# Patient Record
Sex: FEMALE | URBAN - METROPOLITAN AREA
[De-identification: names, ages, dates, MRNs, and addresses within clinical notes are randomized per-mention and may not be internally consistent; named-entity substitution may affect disease eponyms.]

---

## 2024-04-12 ENCOUNTER — LAB (OUTPATIENT)
Dept: URBAN - METROPOLITAN AREA CLINIC 115 | Facility: CLINIC | Age: 84
End: 2024-04-12

## 2024-04-12 ENCOUNTER — OV NP (OUTPATIENT)
Dept: URBAN - METROPOLITAN AREA CLINIC 115 | Facility: CLINIC | Age: 84
End: 2024-04-12
Payer: MEDICARE

## 2024-04-12 VITALS
HEIGHT: 64 IN | TEMPERATURE: 97.6 F | DIASTOLIC BLOOD PRESSURE: 60 MMHG | SYSTOLIC BLOOD PRESSURE: 122 MMHG | BODY MASS INDEX: 20.62 KG/M2 | HEART RATE: 71 BPM | WEIGHT: 120.8 LBS

## 2024-04-12 DIAGNOSIS — R54 ADVANCED AGE: ICD-10-CM

## 2024-04-12 DIAGNOSIS — K62.5 BRBPR (BRIGHT RED BLOOD PER RECTUM): ICD-10-CM

## 2024-04-12 DIAGNOSIS — K52.9 CHRONIC DIARRHEA: ICD-10-CM

## 2024-04-12 PROBLEM — 49808004: Status: ACTIVE | Noted: 2024-04-12

## 2024-04-12 PROCEDURE — 99204 OFFICE O/P NEW MOD 45 MIN: CPT | Performed by: INTERNAL MEDICINE

## 2024-04-12 NOTE — HPI-TODAY'S VISIT:
small TV adenoma removed colon '09. chr diarrhea, increase in frequency recently, over months, only bloody once, no sig pains, on no meds.

## 2024-05-01 ENCOUNTER — OFFICE VISIT (OUTPATIENT)
Dept: URBAN - METROPOLITAN AREA SURGERY CENTER 13 | Facility: SURGERY CENTER | Age: 84
End: 2024-05-01